# Patient Record
Sex: MALE | Race: OTHER | Employment: FULL TIME
[De-identification: names, ages, dates, MRNs, and addresses within clinical notes are randomized per-mention and may not be internally consistent; named-entity substitution may affect disease eponyms.]

---

## 2024-05-25 ENCOUNTER — APPOINTMENT (OUTPATIENT)
Facility: HOSPITAL | Age: 29
End: 2024-05-25

## 2024-05-25 ENCOUNTER — HOSPITAL ENCOUNTER (EMERGENCY)
Facility: HOSPITAL | Age: 29
Discharge: HOME | End: 2024-05-25
Attending: EMERGENCY MEDICINE

## 2024-05-25 VITALS
SYSTOLIC BLOOD PRESSURE: 113 MMHG | DIASTOLIC BLOOD PRESSURE: 70 MMHG | RESPIRATION RATE: 16 BRPM | OXYGEN SATURATION: 99 % | TEMPERATURE: 98.1 F | HEART RATE: 82 BPM | HEIGHT: 71 IN | WEIGHT: 190 LBS | BODY MASS INDEX: 26.6 KG/M2

## 2024-05-25 DIAGNOSIS — M25.561 ACUTE PAIN OF RIGHT KNEE: Primary | ICD-10-CM

## 2024-05-25 PROCEDURE — 99283 EMERGENCY DEPT VISIT LOW MDM: CPT

## 2024-05-25 PROCEDURE — 73562 X-RAY EXAM OF KNEE 3: CPT

## 2024-05-25 PROCEDURE — 6370000000 HC RX 637 (ALT 250 FOR IP): Performed by: EMERGENCY MEDICINE

## 2024-05-25 RX ORDER — IBUPROFEN 800 MG/1
800 TABLET ORAL 3 TIMES DAILY PRN
Qty: 90 TABLET | Refills: 0 | Status: SHIPPED | OUTPATIENT
Start: 2024-05-25

## 2024-05-25 RX ORDER — OXYCODONE HYDROCHLORIDE AND ACETAMINOPHEN 5; 325 MG/1; MG/1
1 TABLET ORAL
Status: COMPLETED | OUTPATIENT
Start: 2024-05-25 | End: 2024-05-25

## 2024-05-25 RX ADMIN — OXYCODONE AND ACETAMINOPHEN 1 TABLET: 5; 325 TABLET ORAL at 10:22

## 2024-05-25 ASSESSMENT — PAIN DESCRIPTION - DESCRIPTORS
DESCRIPTORS: SHARP
DESCRIPTORS: ACHING

## 2024-05-25 ASSESSMENT — ENCOUNTER SYMPTOMS
NAUSEA: 0
VOMITING: 0
COUGH: 0
SORE THROAT: 0
BACK PAIN: 0
EYE PAIN: 0
DIARRHEA: 0
RHINORRHEA: 0
COLOR CHANGE: 0
ABDOMINAL PAIN: 0
SHORTNESS OF BREATH: 0

## 2024-05-25 ASSESSMENT — PAIN - FUNCTIONAL ASSESSMENT
PAIN_FUNCTIONAL_ASSESSMENT: 0-10
PAIN_FUNCTIONAL_ASSESSMENT: 0-10
PAIN_FUNCTIONAL_ASSESSMENT: PREVENTS OR INTERFERES SOME ACTIVE ACTIVITIES AND ADLS

## 2024-05-25 ASSESSMENT — PAIN DESCRIPTION - ORIENTATION
ORIENTATION: RIGHT
ORIENTATION: RIGHT

## 2024-05-25 ASSESSMENT — PAIN SCALES - GENERAL
PAINLEVEL_OUTOF10: 10
PAINLEVEL_OUTOF10: 9

## 2024-05-25 ASSESSMENT — PAIN DESCRIPTION - FREQUENCY: FREQUENCY: CONTINUOUS

## 2024-05-25 ASSESSMENT — PAIN DESCRIPTION - LOCATION
LOCATION: KNEE
LOCATION: KNEE

## 2024-05-25 ASSESSMENT — PAIN DESCRIPTION - PAIN TYPE: TYPE: ACUTE PAIN

## 2024-05-25 NOTE — ED TRIAGE NOTES
Pt presents to ED with c/o pain in right knee since yesterday. Pt denies any hx of trauma. Pt states the knee has bothered him in the past but never this severe.

## 2024-05-25 NOTE — ED PROVIDER NOTES
[facility] EMERGENCY DEPT  EMERGENCY DEPARTMENT ENCOUNTER      Pt Name: Luis Schmidt  MRN: [id number]  Birthdate 1995  Date of evaluation: 5/25/2024  Provider: Jerzy Edwards MD    CHIEF COMPLAINT       Chief Complaint   Patient presents with    Knee Pain         HISTORY OF PRESENT ILLNESS   (Location/Symptom, Timing/Onset, Context/Setting, Quality, Duration, Modifying Factors, Severity)  Note limiting factors.   28-year-old male with right knee pain.  Started having pain yesterday after he tripped going up some stairs.  No major injury or trauma to the knee.  No history of surgery on the knee.  No other injuries.    The history is provided by the patient.   Knee Problem  Location:  Knee  Injury: no    Knee location:  R knee  Pain details:     Quality:  Dull and pressure    Radiates to:  Does not radiate    Severity:  Moderate    Onset quality:  Gradual    Duration:  1 day    Timing:  Constant    Progression:  Worsening  Chronicity:  New  Dislocation: no    Prior injury to area:  No  Relieved by:  Nothing  Worsened by:  Nothing  Associated symptoms: no back pain, no decreased ROM, no fatigue, no fever, no itching and no muscle weakness          Review of External Medical Records:     Nursing Notes were reviewed.    REVIEW OF SYSTEMS    (2-9 systems for level 4, 10 or more for level 5)     Review of Systems   Constitutional:  Negative for fatigue and fever.   HENT:  Negative for ear pain, rhinorrhea and sore throat.    Eyes:  Negative for pain and visual disturbance.   Respiratory:  Negative for cough and shortness of breath.    Cardiovascular:  Negative for chest pain.   Gastrointestinal:  Negative for abdominal pain, diarrhea, nausea and vomiting.   Genitourinary:  Negative for dysuria.   Musculoskeletal:  Negative for arthralgias, back pain and joint swelling.   Skin:  Negative for color change, itching and rash.   Neurological:  Negative for dizziness, syncope and numbness.   Psychiatric/Behavioral:

## 2025-05-20 ENCOUNTER — HOSPITAL ENCOUNTER (EMERGENCY)
Facility: HOSPITAL | Age: 30
Discharge: HOME | End: 2025-05-20
Attending: EMERGENCY MEDICINE

## 2025-05-20 ENCOUNTER — APPOINTMENT (OUTPATIENT)
Facility: HOSPITAL | Age: 30
End: 2025-05-20

## 2025-05-20 VITALS
SYSTOLIC BLOOD PRESSURE: 115 MMHG | HEIGHT: 71 IN | WEIGHT: 188 LBS | BODY MASS INDEX: 26.32 KG/M2 | HEART RATE: 74 BPM | DIASTOLIC BLOOD PRESSURE: 69 MMHG | OXYGEN SATURATION: 97 % | RESPIRATION RATE: 18 BRPM | TEMPERATURE: 98 F

## 2025-05-20 DIAGNOSIS — S69.91XA RIGHT WRIST INJURY, INITIAL ENCOUNTER: Primary | ICD-10-CM

## 2025-05-20 PROCEDURE — 73110 X-RAY EXAM OF WRIST: CPT

## 2025-05-20 PROCEDURE — 99283 EMERGENCY DEPT VISIT LOW MDM: CPT

## 2025-05-20 PROCEDURE — 6370000000 HC RX 637 (ALT 250 FOR IP): Performed by: EMERGENCY MEDICINE

## 2025-05-20 RX ORDER — NAPROXEN 500 MG/1
500 TABLET ORAL 2 TIMES DAILY WITH MEALS
Qty: 60 TABLET | Refills: 0 | Status: SHIPPED | OUTPATIENT
Start: 2025-05-20

## 2025-05-20 RX ORDER — IBUPROFEN 800 MG/1
800 TABLET, FILM COATED ORAL
Status: COMPLETED | OUTPATIENT
Start: 2025-05-20 | End: 2025-05-20

## 2025-05-20 RX ADMIN — IBUPROFEN 800 MG: 800 TABLET, FILM COATED ORAL at 23:32

## 2025-05-20 ASSESSMENT — PAIN DESCRIPTION - LOCATION: LOCATION: WRIST;ARM

## 2025-05-20 ASSESSMENT — PAIN DESCRIPTION - DESCRIPTORS: DESCRIPTORS: THROBBING;SHARP

## 2025-05-20 ASSESSMENT — PAIN SCALES - GENERAL: PAINLEVEL_OUTOF10: 10

## 2025-05-20 ASSESSMENT — PAIN DESCRIPTION - ORIENTATION: ORIENTATION: RIGHT

## 2025-05-20 ASSESSMENT — PAIN - FUNCTIONAL ASSESSMENT: PAIN_FUNCTIONAL_ASSESSMENT: 0-10

## 2025-05-21 NOTE — ED TRIAGE NOTES
Pt ambulatory to room c/o right forearm/wrist pain. Pt fell yesterday and landed palms to the ground.     Pt states the wrist and hand is tingling.    Pt took 500mg tylenol at 3pm

## 2025-05-21 NOTE — ED PROVIDER NOTES
[facility] EMERGENCY DEPARTMENT  EMERGENCY DEPARTMENT ENCOUNTER      Pt Name: Luis Schmidt  MRN: [id number]  Birthdate 1995  Date of evaluation: 5/20/2025  Provider: Baljinder Brown MD    CHIEF COMPLAINT       Chief Complaint   Patient presents with    Wrist Pain         HISTORY OF PRESENT ILLNESS   (Location/Symptom, Timing/Onset, Context/Setting, Quality, Duration, Modifying Factors, Severity)  Note limiting factors.   29-year-old male who presents to the ER for evaluation for right wrist injury sustained 2 days ago after he fell and landed on his right upper extremity.  He describes a dull, throbbing discomfort, severity 8 out of 10, worse with movement, no relieving factors.  He denies any other injury or discomfort at this time.            Review of External Medical Records:     Nursing Notes were reviewed.    REVIEW OF SYSTEMS    (2-9 systems for level 4, 10 or more for level 5)     Review of Systems   All other systems reviewed and are negative.      Except as noted above the remainder of the review of systems was reviewed and negative.       PAST MEDICAL HISTORY   No past medical history on file.      SURGICAL HISTORY     No past surgical history on file.      CURRENT MEDICATIONS       Discharge Medication List as of 5/20/2025 11:29 PM        CONTINUE these medications which have NOT CHANGED    Details   ibuprofen (ADVIL;MOTRIN) 800 MG tablet Take 1 tablet by mouth 3 times daily as needed for Pain, Disp-90 tablet, R-0Normal             ALLERGIES     Patient has no known allergies.    FAMILY HISTORY     No family history on file.       SOCIAL HISTORY       Social History     Socioeconomic History    Marital status:    Tobacco Use    Smoking status: Every Day     Types: Cigarettes     Passive exposure: Never    Smokeless tobacco: Never   Substance and Sexual Activity    Alcohol use: Yes     Alcohol/week: 10.0 - 15.0 standard drinks of alcohol     Types: 10 - 15 Cans of